# Patient Record
Sex: FEMALE | Race: ASIAN | NOT HISPANIC OR LATINO | URBAN - METROPOLITAN AREA
[De-identification: names, ages, dates, MRNs, and addresses within clinical notes are randomized per-mention and may not be internally consistent; named-entity substitution may affect disease eponyms.]

---

## 2017-09-14 ENCOUNTER — IMPORTED ENCOUNTER (OUTPATIENT)
Dept: URBAN - METROPOLITAN AREA CLINIC 9 | Facility: CLINIC | Age: 62
End: 2017-09-14

## 2018-09-24 ENCOUNTER — IMPORTED ENCOUNTER (OUTPATIENT)
Dept: URBAN - METROPOLITAN AREA CLINIC 9 | Facility: CLINIC | Age: 63
End: 2018-09-24

## 2019-09-26 ENCOUNTER — IMPORTED ENCOUNTER (OUTPATIENT)
Dept: URBAN - METROPOLITAN AREA CLINIC 9 | Facility: CLINIC | Age: 64
End: 2019-09-26

## 2019-09-26 PROBLEM — H25.13: Noted: 2019-09-26

## 2019-09-26 PROBLEM — H02.834: Noted: 2019-09-26

## 2019-09-26 PROBLEM — H11.153: Noted: 2019-09-26

## 2019-09-26 PROBLEM — H02.831: Noted: 2019-09-26

## 2019-09-26 PROBLEM — H21.542: Noted: 2019-09-26

## 2019-09-26 PROBLEM — E11.9: Noted: 2019-09-26

## 2019-09-26 PROBLEM — H21.541: Noted: 2019-09-26

## 2021-10-16 ASSESSMENT — KERATOMETRY
OD_K1POWER_DIOPTERS: 42.5
OS_K1POWER_DIOPTERS: 42
OD_AXISANGLE_DEGREES: 23
OD_K2POWER_DIOPTERS: 42
OS_K2POWER_DIOPTERS: 42.5
OS_AXISANGLE_DEGREES: 76
OD_K1POWER_DIOPTERS: 42.5
OS_AXISANGLE_DEGREES: 78
OD_K1POWER_DIOPTERS: 42.25
OS_K2POWER_DIOPTERS: 42.25
OS_AXISANGLE2_DEGREES: 166
OS_AXISANGLE_DEGREES: 170
OD_AXISANGLE2_DEGREES: 90
OS_K1POWER_DIOPTERS: 41.75
OS_K1POWER_DIOPTERS: 42.75
OS_AXISANGLE2_DEGREES: 80
OS_AXISANGLE2_DEGREES: 168
OD_K2POWER_DIOPTERS: 42.25
OD_AXISANGLE2_DEGREES: 113
OD_AXISANGLE2_DEGREES: 90
OD_AXISANGLE_DEGREES: 180
OD_K2POWER_DIOPTERS: 42.5
OS_K2POWER_DIOPTERS: 42.5
OD_AXISANGLE_DEGREES: 180

## 2021-10-16 ASSESSMENT — VISUAL ACUITY
OD_SC: 20/70 SN
OS_CC: 20/20 SN
OD_CC: 20/20 - SN
OS_CC: 20/20 SN
OS_SC: 20/60 - SN
OD_CC: 20/20 - SN
OS_CC: 20/20 -2 SN
OD_CC: 20/20 SN
OS_CC: 20/20 - SN
OS_CC: 20/25 - SN
OD_CC: 20/20 SN
OD_CC: 20/20 - SN
OS_CC: 20/25 SN
OD_CC: 20/20 SN

## 2021-10-16 ASSESSMENT — TONOMETRY
OS_IOP_MMHG: 17
OD_IOP_MMHG: 16
OS_IOP_MMHG: 13
OD_IOP_MMHG: 14
OS_IOP_MMHG: 16
OD_IOP_MMHG: 16

## 2022-05-23 ENCOUNTER — ESTABLISHED PATIENT (OUTPATIENT)
Dept: URBAN - METROPOLITAN AREA CLINIC 4 | Facility: CLINIC | Age: 67
End: 2022-05-23

## 2022-05-23 DIAGNOSIS — H25.13: ICD-10-CM

## 2022-05-23 DIAGNOSIS — H11.153: ICD-10-CM

## 2022-05-23 DIAGNOSIS — E11.9: ICD-10-CM

## 2022-05-23 PROCEDURE — 92015 DETERMINE REFRACTIVE STATE: CPT

## 2022-05-23 PROCEDURE — 92014 COMPRE OPH EXAM EST PT 1/>: CPT

## 2022-05-23 ASSESSMENT — VISUAL ACUITY
OD_SC: 20/60+1
OS_CC: 20/25-1
OU_CC: 20/20
OU_SC: 20/40
OD_CC: 20/20
OD_GLARE: 20/40
OS_GLARE: 20/40
OS_SC: 20/60-1

## 2022-05-23 ASSESSMENT — KERATOMETRY
OD_AXISANGLE2_DEGREES: 90
OD_K2POWER_DIOPTERS: 42.25
OD_AXISANGLE2_DEGREES: 10
OD_AXISANGLE_DEGREES: 180
OD_K2POWER_DIOPTERS: 42.50
OS_AXISANGLE2_DEGREES: 171
OS_AXISANGLE_DEGREES: 081
OS_K1POWER_DIOPTERS: 41.75
OS_AXISANGLE_DEGREES: 78
OS_AXISANGLE2_DEGREES: 168
OS_K2POWER_DIOPTERS: 42.25
OD_K1POWER_DIOPTERS: 42.25
OD_AXISANGLE_DEGREES: 100

## 2022-05-23 ASSESSMENT — TONOMETRY
OD_IOP_MMHG: 15
OS_IOP_MMHG: 14

## 2022-07-02 RX ORDER — ROSUVASTATIN CALCIUM 40 MG/1
TABLET, COATED ORAL
COMMUNITY
Start: 2022-03-24

## 2022-07-02 RX ORDER — ALENDRONATE SODIUM 10 MG/1
TABLET ORAL
COMMUNITY
Start: 2022-03-24 | End: 2023-03-19

## 2022-07-02 RX ORDER — FENOFIBRATE 160 MG/1
TABLET ORAL
COMMUNITY
Start: 2022-03-24

## 2022-07-16 PROBLEM — R82.81 PYURIA: Status: ACTIVE | Noted: 2022-07-16

## 2022-07-16 PROBLEM — L81.1 MELASMA: Status: ACTIVE | Noted: 2022-07-16

## 2022-07-16 PROBLEM — M15.9 GENERALIZED OSTEOARTHRITIS: Status: ACTIVE | Noted: 2022-07-16

## 2022-07-16 PROBLEM — M47.812 CERVICAL OSTEOARTHRITIS: Status: ACTIVE | Noted: 2022-07-16

## 2022-07-16 PROBLEM — B02.9 HERPES ZOSTER: Status: ACTIVE | Noted: 2022-07-16

## 2022-07-16 PROBLEM — J30.9 ALLERGIC RHINITIS: Status: ACTIVE | Noted: 2022-07-16

## 2022-07-16 PROBLEM — M54.12 CERVICAL RADICULOPATHY: Status: ACTIVE | Noted: 2022-07-16

## 2022-07-16 PROBLEM — M75.00 ADHESIVE CAPSULITIS OF SHOULDER: Status: ACTIVE | Noted: 2022-07-16

## 2022-07-16 PROBLEM — E11.65 HYPERGLYCEMIA DUE TO TYPE 2 DIABETES MELLITUS (HCC): Status: ACTIVE | Noted: 2022-07-16

## 2022-07-16 PROBLEM — E78.00 HYPERCHOLESTEROLEMIA: Status: ACTIVE | Noted: 2022-07-16

## 2022-07-16 PROBLEM — E78.2 MIXED HYPERLIPIDEMIA: Status: ACTIVE | Noted: 2022-07-16

## 2022-07-16 PROBLEM — E78.1 PURE HYPERGLYCERIDEMIA: Status: ACTIVE | Noted: 2022-07-16

## 2022-07-16 PROBLEM — M79.10 MYALGIA: Status: ACTIVE | Noted: 2022-07-16

## 2022-07-16 PROBLEM — M10.9 ACUTE GOUT: Status: ACTIVE | Noted: 2022-07-16

## 2022-07-16 PROBLEM — J45.901 EXACERBATION OF ASTHMA: Status: ACTIVE | Noted: 2022-07-16

## 2022-07-16 PROBLEM — J04.0 ACUTE LARYNGITIS: Status: ACTIVE | Noted: 2022-07-16

## 2022-07-16 PROBLEM — M25.579 PAIN IN JOINT INVOLVING ANKLE AND FOOT: Status: ACTIVE | Noted: 2022-07-16

## 2022-07-16 PROBLEM — J32.9 CHRONIC SINUSITIS: Status: ACTIVE | Noted: 2022-07-16

## 2022-07-16 PROBLEM — M25.569 ARTHRALGIA OF LOWER LEG: Status: ACTIVE | Noted: 2022-07-16

## 2022-07-16 PROBLEM — E11.9 DIABETES MELLITUS TYPE 2 IN NONOBESE (HCC): Status: ACTIVE | Noted: 2022-07-16

## 2022-07-16 PROBLEM — M47.819 SPONDYLOSIS WITHOUT MYELOPATHY: Status: ACTIVE | Noted: 2022-07-16

## 2022-07-16 PROBLEM — J45.909 ASTHMA WITHOUT STATUS ASTHMATICUS: Status: ACTIVE | Noted: 2022-07-16

## 2022-07-16 PROBLEM — F32.A DEPRESSION: Status: ACTIVE | Noted: 2022-07-16

## 2022-07-16 PROBLEM — J40 BRONCHITIS: Status: ACTIVE | Noted: 2022-07-16

## 2022-07-16 PROBLEM — R73.9 HYPERGLYCEMIA: Status: ACTIVE | Noted: 2022-07-16

## 2022-07-16 PROBLEM — J45.20 INTERMITTENT ASTHMA: Status: ACTIVE | Noted: 2022-07-16

## 2022-07-16 PROBLEM — G56.00 CARPAL TUNNEL SYNDROME: Status: ACTIVE | Noted: 2022-07-16

## 2022-07-16 PROBLEM — E78.5 HYPERLIPIDEMIA: Status: ACTIVE | Noted: 2022-07-16

## 2022-07-16 PROBLEM — M25.551 RIGHT HIP PAIN: Status: ACTIVE | Noted: 2022-07-16

## 2022-07-16 PROBLEM — B00.1 COLD SORE: Status: ACTIVE | Noted: 2022-07-16

## 2022-07-16 PROBLEM — M25.519 SHOULDER PAIN: Status: ACTIVE | Noted: 2022-07-16

## 2022-07-16 PROBLEM — M81.0 SENILE OSTEOPOROSIS: Status: ACTIVE | Noted: 2022-07-16

## 2022-07-16 PROBLEM — J98.01 BRONCHOSPASM: Status: ACTIVE | Noted: 2022-07-16

## 2022-07-16 PROBLEM — J02.9 ACUTE PHARYNGITIS: Status: ACTIVE | Noted: 2022-07-16

## 2022-07-16 PROBLEM — B07.0 PLANTAR WART: Status: ACTIVE | Noted: 2022-07-16

## 2022-09-02 PROBLEM — M10.9 ACUTE GOUT: Status: RESOLVED | Noted: 2022-07-16 | Resolved: 2022-09-02

## 2022-09-02 PROBLEM — J40 BRONCHITIS: Status: RESOLVED | Noted: 2022-07-16 | Resolved: 2022-09-02

## 2022-09-02 PROBLEM — J98.01 BRONCHOSPASM: Status: RESOLVED | Noted: 2022-07-16 | Resolved: 2022-09-02

## 2023-01-26 NOTE — PATIENT DISCUSSION
Monitor. [Carbohydrate Consistent Diet] : carbohydrate consistent diet [FreeTextEntry1] : Adjusted basal setting by lowering to 0.05 u/hr, titrations instructions provided as we will increase metformin 1000mg bid given ongoing weight gain.\par BP, controlled\par LDL controlled, TGL improved on omega 3 FA. We reviewed treatment options. I advise to contine statin/tricor and continued Omega 3 FA 2 gm BID for further CVD risk reduction and TGL control.\par optho and podiatry UTD for 2019.\par TFTs at target on 5/2019. cont LT4 75 mcg fir now, proper intake reviewed. She is due for surveillance US for subcm nodules observed in 2017. referral provided\par rtc 3 months explained the potential high risk and toxicities with chronic insulin use including, but not limited to life threatening hypoglycemia and death, Verbalized understanding and agrees with treatment plan, will contact MD and seek emergency medical care if condition changes. Continue medtronic [id number].\par  [Long Term Vascular Complications] : long term vascular complications of diabetes [Hypoglycemia Management] : hypoglycemia management [Importance of Diet and Exercise] : importance of diet and exercise to improve glycemic control, achieve weight loss and improve cardiovascular health [Self Monitoring of Blood Glucose] : self monitoring of blood glucose [Action and use of Insulin] : action and use of short and long-acting insulin [Levothyroxine] : The patient was instructed to take Levothyroxine on an empty stomach, separate from vitamins, and wait at least 30 minutes before eating

## 2023-05-25 ENCOUNTER — ESTABLISHED PATIENT (OUTPATIENT)
Dept: URBAN - METROPOLITAN AREA CLINIC 4 | Facility: CLINIC | Age: 68
End: 2023-05-25

## 2023-05-25 DIAGNOSIS — H02.834: ICD-10-CM

## 2023-05-25 DIAGNOSIS — E11.9: ICD-10-CM

## 2023-05-25 DIAGNOSIS — H02.831: ICD-10-CM

## 2023-05-25 DIAGNOSIS — H25.13: ICD-10-CM

## 2023-05-25 DIAGNOSIS — H11.153: ICD-10-CM

## 2023-05-25 PROCEDURE — 92014 COMPRE OPH EXAM EST PT 1/>: CPT

## 2023-05-25 PROCEDURE — 92015 DETERMINE REFRACTIVE STATE: CPT

## 2023-05-25 ASSESSMENT — VISUAL ACUITY
OD_GLARE: 20/100
OS_CC: 20/40
OU_CC: 20/30-2
OD_CC: 20/30-2
OS_GLARE: 20/100

## 2023-05-25 ASSESSMENT — TONOMETRY
OD_IOP_MMHG: 17
OS_IOP_MMHG: 19

## 2023-05-25 ASSESSMENT — KERATOMETRY
OD_AXISANGLE2_DEGREES: 90
OD_K1POWER_DIOPTERS: 42.25
OS_AXISANGLE2_DEGREES: 172
OS_AXISANGLE_DEGREES: 82
OD_AXISANGLE_DEGREES: 180
OD_K2POWER_DIOPTERS: 42.25
OS_K1POWER_DIOPTERS: 42.00
OS_K2POWER_DIOPTERS: 42.75

## 2024-06-12 ENCOUNTER — ESTABLISHED PATIENT (OUTPATIENT)
Facility: LOCATION | Age: 69
End: 2024-06-12

## 2024-06-12 DIAGNOSIS — H25.13: ICD-10-CM

## 2024-06-12 DIAGNOSIS — H43.813: ICD-10-CM

## 2024-06-12 DIAGNOSIS — H02.831: ICD-10-CM

## 2024-06-12 DIAGNOSIS — H02.834: ICD-10-CM

## 2024-06-12 DIAGNOSIS — H11.153: ICD-10-CM

## 2024-06-12 DIAGNOSIS — E11.9: ICD-10-CM

## 2024-06-12 PROCEDURE — 92015 DETERMINE REFRACTIVE STATE: CPT

## 2024-06-12 PROCEDURE — 92014 COMPRE OPH EXAM EST PT 1/>: CPT

## 2024-06-12 ASSESSMENT — VISUAL ACUITY
OD_SC: 20/60-1
OU_SC: 20/60
OS_SC: 20/400
OS_CC: 20/40-2
OD_GLARE: 20/100
OS_GLARE: 20/100
OD_CC: 20/40

## 2024-06-12 ASSESSMENT — KERATOMETRY
OD_AXISANGLE2_DEGREES: 90
OS_AXISANGLE_DEGREES: 83
OD_AXISANGLE_DEGREES: 180
OD_K1POWER_DIOPTERS: 42.50
OS_K2POWER_DIOPTERS: 42.75
OD_K2POWER_DIOPTERS: 42.50
OS_AXISANGLE2_DEGREES: 173
OS_K1POWER_DIOPTERS: 41.75

## 2024-06-12 ASSESSMENT — TONOMETRY
OS_IOP_MMHG: 16
OD_IOP_MMHG: 13

## 2025-07-02 ENCOUNTER — COMPREHENSIVE EXAM (OUTPATIENT)
Age: 70
End: 2025-07-02

## 2025-07-02 DIAGNOSIS — H43.813: ICD-10-CM

## 2025-07-02 DIAGNOSIS — H25.13: ICD-10-CM

## 2025-07-02 DIAGNOSIS — H21.542: ICD-10-CM

## 2025-07-02 DIAGNOSIS — H02.831: ICD-10-CM

## 2025-07-02 DIAGNOSIS — E11.9: ICD-10-CM

## 2025-07-02 DIAGNOSIS — H11.153: ICD-10-CM

## 2025-07-02 DIAGNOSIS — H02.834: ICD-10-CM

## 2025-07-02 PROCEDURE — 92014 COMPRE OPH EXAM EST PT 1/>: CPT

## 2025-07-02 PROCEDURE — 92015 DETERMINE REFRACTIVE STATE: CPT
